# Patient Record
Sex: FEMALE | URBAN - METROPOLITAN AREA
[De-identification: names, ages, dates, MRNs, and addresses within clinical notes are randomized per-mention and may not be internally consistent; named-entity substitution may affect disease eponyms.]

---

## 2021-08-04 ENCOUNTER — OFFICE VISIT (OUTPATIENT)
Dept: URBAN - METROPOLITAN AREA CLINIC 54 | Facility: CLINIC | Age: 71
End: 2021-08-04
Payer: MEDICARE

## 2021-08-04 DIAGNOSIS — E11.3293 TYPE 2 DIABETES MELLITUS W/ MILD NONPROLIFERATIVE DIABETIC RETINOPATHY W/O MACULAR EDEMA OF BILATERAL EYES: Primary | ICD-10-CM

## 2021-08-04 DIAGNOSIS — H35.373 PUCKERING OF MACULA, BILATERAL: ICD-10-CM

## 2021-08-04 PROCEDURE — 92235 FLUORESCEIN ANGRPH MLTIFRAME: CPT | Performed by: OPHTHALMOLOGY

## 2021-08-04 PROCEDURE — 99204 OFFICE O/P NEW MOD 45 MIN: CPT | Performed by: OPHTHALMOLOGY

## 2021-08-04 PROCEDURE — 92134 CPTRZ OPH DX IMG PST SGM RTA: CPT | Performed by: OPHTHALMOLOGY

## 2021-08-04 ASSESSMENT — INTRAOCULAR PRESSURE
OS: 12
OD: 14

## 2021-08-04 NOTE — IMPRESSION/PLAN
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 Plan: Radha Pascual, thanks for referring this nice woman. She is s/p focal laser in the past (Juliano Lackey). Exam shows no active NVE or CSME OU. 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 FA shows: angiographic edema OU and peripheral ischemia OD but no NVE OU. Colors show DBH OU. We discussed the findings and natural history. I recommend observation with tight BS control. She will f/u with her optometrist for refraction. thanks Radha Pascual RTC PRN Prior notes from other provider(s) have been reviewed in conjunction with today's visit.